# Patient Record
Sex: MALE | Race: ASIAN | NOT HISPANIC OR LATINO | Employment: FULL TIME | URBAN - METROPOLITAN AREA
[De-identification: names, ages, dates, MRNs, and addresses within clinical notes are randomized per-mention and may not be internally consistent; named-entity substitution may affect disease eponyms.]

---

## 2017-04-04 ENCOUNTER — NON-PROVIDER VISIT (OUTPATIENT)
Dept: MEDICAL GROUP | Facility: PHYSICIAN GROUP | Age: 42
End: 2017-04-04

## 2017-04-04 DIAGNOSIS — Z91.09 ENVIRONMENTAL ALLERGIES: ICD-10-CM

## 2017-04-04 PROCEDURE — 96372 THER/PROPH/DIAG INJ SC/IM: CPT | Performed by: NURSE PRACTITIONER

## 2017-04-04 RX ORDER — TRIAMCINOLONE ACETONIDE 40 MG/ML
40 INJECTION, SUSPENSION INTRA-ARTICULAR; INTRAMUSCULAR ONCE
Status: COMPLETED | OUTPATIENT
Start: 2017-04-04 | End: 2017-04-04

## 2017-04-04 RX ADMIN — TRIAMCINOLONE ACETONIDE 40 MG: 40 INJECTION, SUSPENSION INTRA-ARTICULAR; INTRAMUSCULAR at 10:24

## 2017-04-04 NOTE — MR AVS SNAPSHOT
Joes Stroud   2017 9:15 AM   Non-Provider Visit   MRN: [id number]    Department:  [facility]   Dept Phone:  [phone]    Description:  Male : 1975   Provider:  RADHA TITUS MA           Reason for Visit     Injections Kenalog      Allergies as of 2017     No Known Allergies      You were diagnosed with     Environmental allergies   [585971]         Vital Signs     Smoking Status                   Never Smoker            Basic Information     Date Of Birth Sex Race Ethnicity Preferred Language    1975 Male  Non- English      Problem List              ICD-10-CM Priority Class Noted - Resolved    Environmental allergies Z91.09   2016 - Present      Health Maintenance        Date Due Completion Dates    IMM DTaP/Tdap/Td Vaccine (1 - Tdap) 1993 ---            Current Immunizations     No immunizations on file.      Below and/or attached are the medications your provider expects you to take. Review all of your home medications and newly ordered medications with your provider and/or pharmacist. Follow medication instructions as directed by your provider and/or pharmacist. Please keep your medication list with you and share with your provider. Update the information when medications are discontinued, doses are changed, or new medications (including over-the-counter products) are added; and carry medication information at all times in the event of emergency situations     Allergies:  No Known Allergies          Medications  Valid as of: 2017 - 11:43 AM    Generic Name Brand Name Tablet Size Instructions for use    .                 Medicines prescribed today were sent to:     [id number] [place] [address] [facility]    [address] [place] [address]    Phone: [phone] Fax: [phone]    Open 24 Hours?: No      Medication refill instructions:       If your prescription bottle indicates you have medication refills left, it is  not necessary to call your provider’s office. Please contact your pharmacy and they will refill your medication.    If your prescription bottle indicates you do not have any refills left, you may request refills at any time through one of the following ways: The online [employer] system (except Urgent Care), by calling your provider’s office, or by asking your pharmacy to contact your provider’s office with a refill request. Medication refills are processed only during regular business hours and may not be available until the next business day. Your provider may request additional information or to have a follow-up visit with you prior to refilling your medication.   *Please Note: Medication refills are assigned a new Rx number when refilled electronically. Your pharmacy may indicate that no refills were authorized even though a new prescription for the same medication is available at the pharmacy. Please request the medicine by name with the pharmacy before contacting your provider for a refill.           [employer] Status: Patient Declined

## 2017-04-04 NOTE — PROGRESS NOTES
Jose Stroud is a 41 y.o. male here for a non-provider visit for Kenalog injection.    Reason for injection: Environmental Allergies  Order in MAR?: No  Patient supplied?:No  Minimum interval has been met for this injection (per MAR order): Yes    Order and dose verified by:  Patient tolerated injection and no adverse effects were observed or reported yes.    # of Administrations remaining in MAR:0

## 2017-08-23 ENCOUNTER — TELEPHONE (OUTPATIENT)
Dept: MEDICAL GROUP | Facility: PHYSICIAN GROUP | Age: 42
End: 2017-08-23

## 2017-08-23 ENCOUNTER — NON-PROVIDER VISIT (OUTPATIENT)
Dept: MEDICAL GROUP | Facility: PHYSICIAN GROUP | Age: 42
End: 2017-08-23

## 2017-08-23 DIAGNOSIS — Z91.09 ENVIRONMENTAL ALLERGIES: ICD-10-CM

## 2017-08-23 PROCEDURE — 96372 THER/PROPH/DIAG INJ SC/IM: CPT | Performed by: NURSE PRACTITIONER

## 2017-08-23 RX ORDER — TRIAMCINOLONE ACETONIDE 40 MG/ML
40 INJECTION, SUSPENSION INTRA-ARTICULAR; INTRAMUSCULAR ONCE
Status: COMPLETED | OUTPATIENT
Start: 2017-08-23 | End: 2017-08-23

## 2017-08-23 RX ADMIN — TRIAMCINOLONE ACETONIDE 40 MG: 40 INJECTION, SUSPENSION INTRA-ARTICULAR; INTRAMUSCULAR at 09:49

## 2017-08-23 NOTE — NON-PROVIDER
Jose Stroud is a 41 y.o. male here for a non-provider visit for Kenolog injection.    Reason for injection: Environmental Allergies  Order in MAR?: Yes  Patient supplied?:No  Minimum interval has been met for this injection (per MAR order): Yes    Order and dose verified by: ML   Patient tolerated injection and no adverse effects were observed or reported: Yes    # of Administrations remaining in MAR: 0

## 2017-08-23 NOTE — MR AVS SNAPSHOT
Jose Stroud   2017 9:00 AM   Non-Provider Visit   MRN: [id number]    Department:  [facility]   Dept Phone:  [phone]    Description:  Male : 1975   Provider:  RADHA TITUS MA           Reason for Visit     Injections Kenolog      Allergies as of 2017     No Known Allergies      You were diagnosed with     Environmental allergies   [139688]         Vital Signs     Smoking Status                   Never Smoker            Basic Information     Date Of Birth Sex Race Ethnicity Preferred Language    1975 Male  Non- English      Problem List              ICD-10-CM Priority Class Noted - Resolved    Environmental allergies Z91.09   2016 - Present      Health Maintenance        Date Due Completion Dates    IMM DTaP/Tdap/Td Vaccine (1 - Tdap) 1993 ---    IMM INFLUENZA (1) 2017 ---            Current Immunizations     No immunizations on file.      Below and/or attached are the medications your provider expects you to take. Review all of your home medications and newly ordered medications with your provider and/or pharmacist. Follow medication instructions as directed by your provider and/or pharmacist. Please keep your medication list with you and share with your provider. Update the information when medications are discontinued, doses are changed, or new medications (including over-the-counter products) are added; and carry medication information at all times in the event of emergency situations     Allergies:  No Known Allergies          Medications  Valid as of: 2017 -  9:52 AM    Generic Name Brand Name Tablet Size Instructions for use    .                 Medicines prescribed today were sent to:     [id number] [place] [address] [place] [address] [address] [place] [address]    Phone: [phone] Fax: [phone]    Open 24 Hours?: No      Medication refill instructions:       If your prescription bottle indicates you  have medication refills left, it is not necessary to call your provider’s office. Please contact your pharmacy and they will refill your medication.    If your prescription bottle indicates you do not have any refills left, you may request refills at any time through one of the following ways: The online [employer] system (except Urgent Care), by calling your provider’s office, or by asking your pharmacy to contact your provider’s office with a refill request. Medication refills are processed only during regular business hours and may not be available until the next business day. Your provider may request additional information or to have a follow-up visit with you prior to refilling your medication.   *Please Note: Medication refills are assigned a new Rx number when refilled electronically. Your pharmacy may indicate that no refills were authorized even though a new prescription for the same medication is available at the pharmacy. Please request the medicine by name with the pharmacy before contacting your provider for a refill.           [employer] Status: Patient Declined

## 2018-01-31 ENCOUNTER — OFFICE VISIT (OUTPATIENT)
Dept: MEDICAL GROUP | Facility: PHYSICIAN GROUP | Age: 43
End: 2018-01-31

## 2018-01-31 VITALS
HEIGHT: 68 IN | RESPIRATION RATE: 16 BRPM | WEIGHT: 151 LBS | SYSTOLIC BLOOD PRESSURE: 110 MMHG | TEMPERATURE: 97.9 F | OXYGEN SATURATION: 96 % | HEART RATE: 70 BPM | BODY MASS INDEX: 22.88 KG/M2 | DIASTOLIC BLOOD PRESSURE: 80 MMHG

## 2018-01-31 DIAGNOSIS — Z76.89 ENCOUNTER TO ESTABLISH CARE WITH NEW DOCTOR: ICD-10-CM

## 2018-01-31 DIAGNOSIS — Z91.09 ENVIRONMENTAL ALLERGIES: ICD-10-CM

## 2018-01-31 PROCEDURE — 99213 OFFICE O/P EST LOW 20 MIN: CPT | Performed by: NURSE PRACTITIONER

## 2018-01-31 RX ORDER — TRIAMCINOLONE ACETONIDE 40 MG/ML
40 INJECTION, SUSPENSION INTRA-ARTICULAR; INTRAMUSCULAR ONCE
Status: COMPLETED | OUTPATIENT
Start: 2018-01-31 | End: 2018-01-31

## 2018-01-31 RX ADMIN — TRIAMCINOLONE ACETONIDE 40 MG: 40 INJECTION, SUSPENSION INTRA-ARTICULAR; INTRAMUSCULAR at 10:46

## 2018-01-31 ASSESSMENT — PATIENT HEALTH QUESTIONNAIRE - PHQ9: CLINICAL INTERPRETATION OF PHQ2 SCORE: 0

## 2018-01-31 NOTE — PROGRESS NOTES
Chief Complaint   Patient presents with   • Establish Care   • Other     Kenalog shot       HISTORY OF PRESENT ILLNESS: Patient is a 42 y.o. male new patient who presents today to discuss the following issues:    Encounter to establish care with new doctor  Is here to establish with a new primary care provider.  Was previously seen by Serenity Barnes.    Environmental allergies  Allergies have been terrible, and much earlier this year.  Would like to proceed with a Kenalog shot. Gets them twice a year on average.      Patient Active Problem List    Diagnosis Date Noted   • Encounter to establish care with new doctor 01/31/2018   • Environmental allergies 04/01/2016       Allergies:Patient has no known allergies.    No current outpatient prescriptions on file.     Current Facility-Administered Medications   Medication Dose Route Frequency Provider Last Rate Last Dose   • triamcinolone acetonide (KENALOG-40) injection 40 mg  40 mg Intramuscular Once Darwin Bell, A.P.N.           Social History   Substance Use Topics   • Smoking status: Never Smoker   • Smokeless tobacco: Never Used   • Alcohol use Yes      Comment: occ       No family status information on file.   History reviewed. No pertinent family history.    Review of Systems:   Constitutional: Negative for fever, chills, weight loss and malaise/fatigue.   HENT: Negative for ear pain, nosebleeds, congestion, sore throat and neck pain.  Positive for seasonal allergies.  Eyes: Negative for blurred vision.   Respiratory: Negative for cough, sputum production, shortness of breath and wheezing.    Cardiovascular: Negative for chest pain, palpitations, orthopnea and leg swelling.   Gastrointestinal: Negative for heartburn, nausea, vomiting and abdominal pain.   Genitourinary: Negative for dysuria, urgency and frequency.   Musculoskeletal: Negative for myalgias, joint pain, and back pain.  Skin: Negative for rash and itching.   Neurological: Negative for  "dizziness, tingling, tremors, sensory change, focal weakness and headaches.   Endo/Heme/Allergies: Does not bruise/bleed easily.   Psychiatric/Behavioral: Negative for depression, suicidal ideas and memory loss.  The patient is not nervous/anxious and does not have insomnia.    All other systems reviewed and are negative except as in HPI.    Exam:  Blood pressure 110/80, pulse 70, temperature 36.6 °C (97.9 °F), resp. rate 16, height 1.727 m (5' 8\"), weight 68.5 kg (151 lb), SpO2 96 %.  General:  Well nourished, well developed male in NAD  Head: Grossly normal.  Neck: Supple without JVD or bruit. Thyroid is not enlarged.  Pulmonary: Clear to ausculation. Normal effort. No rales, ronchi, or wheezing.  Cardiovascular: Regular rate and rhythm without murmur.   Abdomen:  Bowel sounds + x 4. Soft, non-tender, nondistended.  Extremities: No clubbing, cyanosis, or edema.  Skin: Intact with no obvious rashes or lesions.  Neuro: Grossly intact.  Psych: Alert and oriented x 3.  Mood and affect appropriate.    Medical decision-making and discussion: Jose is here to establish with a new primary care provider.  We reviewed his past medical history and discussed his current medications.  He was given a Kenalog shot. He will sign a records release for his previous provider, he will sign up with [facility], and he will plan to follow-up here as needed.     I have placed the below orders and discussed them with an approved delegating provider. The MA is performing the below orders under the direction of Dr. Leon, who have provided verbal consent for supervision.          Assessment/Plan:  1. Encounter to establish care with new doctor     2. Environmental allergies  triamcinolone acetonide (KENALOG-40) injection 40 mg       Return if symptoms worsen or fail to improve.    Please note that this dictation was created using voice recognition software. I have made every reasonable attempt to correct obvious errors, but I expect that there " are errors of grammar and possibly content that I did not discover before finalizing the note.

## 2018-01-31 NOTE — ASSESSMENT & PLAN NOTE
Allergies have been terrible, and much earlier this year.  Would like to proceed with a Kenalog shot. Gets them twice a year on average.

## 2018-08-30 ENCOUNTER — NON-PROVIDER VISIT (OUTPATIENT)
Dept: MEDICAL GROUP | Facility: PHYSICIAN GROUP | Age: 43
End: 2018-08-30

## 2018-08-30 ENCOUNTER — APPOINTMENT (OUTPATIENT)
Dept: MEDICAL GROUP | Facility: PHYSICIAN GROUP | Age: 43
End: 2018-08-30

## 2018-08-30 DIAGNOSIS — Z91.09 ENVIRONMENTAL ALLERGIES: ICD-10-CM

## 2018-08-30 PROCEDURE — 96372 THER/PROPH/DIAG INJ SC/IM: CPT | Performed by: NURSE PRACTITIONER

## 2018-08-30 RX ORDER — TRIAMCINOLONE ACETONIDE 40 MG/ML
40 INJECTION, SUSPENSION INTRA-ARTICULAR; INTRAMUSCULAR ONCE
Status: COMPLETED | OUTPATIENT
Start: 2018-08-30 | End: 2018-08-30

## 2018-08-30 RX ADMIN — TRIAMCINOLONE ACETONIDE 40 MG: 40 INJECTION, SUSPENSION INTRA-ARTICULAR; INTRAMUSCULAR at 11:29

## 2018-08-30 NOTE — PROGRESS NOTES
Jose Stroud is a 42 y.o. male here for a non-provider visit for kenalog injection.    Reason for injection: Environmental allergies   Order in MAR?: Yes  Patient supplied?:No  Minimum interval has been met for this injection (per MAR order): Yes    Order and dose verified by: RT  Patient tolerated injection and no adverse effects were observed or reported: Yes    # of Administrations remaining in MAR: n/a

## 2019-04-11 ENCOUNTER — OFFICE VISIT (OUTPATIENT)
Dept: MEDICAL GROUP | Facility: PHYSICIAN GROUP | Age: 44
End: 2019-04-11

## 2019-04-11 VITALS
BODY MASS INDEX: 22.73 KG/M2 | TEMPERATURE: 98 F | SYSTOLIC BLOOD PRESSURE: 122 MMHG | RESPIRATION RATE: 16 BRPM | HEIGHT: 68 IN | WEIGHT: 150 LBS | OXYGEN SATURATION: 96 % | DIASTOLIC BLOOD PRESSURE: 74 MMHG | HEART RATE: 70 BPM

## 2019-04-11 DIAGNOSIS — J30.2 SEASONAL ALLERGIES: ICD-10-CM

## 2019-04-11 DIAGNOSIS — Z91.09 ENVIRONMENTAL ALLERGIES: ICD-10-CM

## 2019-04-11 PROBLEM — Z76.89 ENCOUNTER TO ESTABLISH CARE WITH NEW DOCTOR: Status: RESOLVED | Noted: 2018-01-31 | Resolved: 2019-04-11

## 2019-04-11 PROCEDURE — 99212 OFFICE O/P EST SF 10 MIN: CPT | Performed by: NURSE PRACTITIONER

## 2019-04-11 RX ORDER — TRIAMCINOLONE ACETONIDE 40 MG/ML
40 INJECTION, SUSPENSION INTRA-ARTICULAR; INTRAMUSCULAR ONCE
Status: COMPLETED | OUTPATIENT
Start: 2019-04-11 | End: 2019-04-11

## 2019-04-11 RX ADMIN — TRIAMCINOLONE ACETONIDE 40 MG: 40 INJECTION, SUSPENSION INTRA-ARTICULAR; INTRAMUSCULAR at 15:32

## 2019-04-11 ASSESSMENT — PATIENT HEALTH QUESTIONNAIRE - PHQ9: CLINICAL INTERPRETATION OF PHQ2 SCORE: 0

## 2019-04-16 NOTE — ASSESSMENT & PLAN NOTE
Allergies have been terrible and he would like to proceed with a Kenalog shot.  Gets them once a year and understands risks vs benefits.  He will plan to proceed with lab work when he is able to get health insurance.

## 2020-03-06 ENCOUNTER — TELEPHONE (OUTPATIENT)
Dept: MEDICAL GROUP | Facility: PHYSICIAN GROUP | Age: 45
End: 2020-03-06

## 2020-03-06 DIAGNOSIS — Z91.09 ENVIRONMENTAL ALLERGIES: ICD-10-CM

## 2020-03-06 NOTE — TELEPHONE ENCOUNTER
Pt is on MA schedule for Mon 03/10/2020 for allergy shot and needs an order.  Last seen 04/22/2019.

## 2020-03-10 ENCOUNTER — NON-PROVIDER VISIT (OUTPATIENT)
Dept: MEDICAL GROUP | Facility: PHYSICIAN GROUP | Age: 45
End: 2020-03-10

## 2020-03-10 DIAGNOSIS — Z91.09 ENVIRONMENTAL ALLERGIES: ICD-10-CM

## 2020-03-10 PROCEDURE — 96372 THER/PROPH/DIAG INJ SC/IM: CPT | Performed by: NURSE PRACTITIONER

## 2020-03-10 RX ORDER — TRIAMCINOLONE ACETONIDE 40 MG/ML
40 INJECTION, SUSPENSION INTRA-ARTICULAR; INTRAMUSCULAR ONCE
Status: COMPLETED | OUTPATIENT
Start: 2020-03-10 | End: 2020-03-10

## 2020-03-10 RX ADMIN — TRIAMCINOLONE ACETONIDE 40 MG: 40 INJECTION, SUSPENSION INTRA-ARTICULAR; INTRAMUSCULAR at 09:04

## 2020-03-10 NOTE — PROGRESS NOTES
Jose العلي is a 44 y.o. male here for a non-provider visit for kenalog injection.    Reason for injection: environmental allergies  Order in MAR?: Yes  Patient supplied?:No  Minimum interval has been met for this injection (per MAR order): Yes    Order and dose verified by: CAROL  Patient tolerated injection and no adverse effects were observed or reported: Yes    # of Administrations remaining in MAR: 0

## 2021-04-06 ENCOUNTER — OFFICE VISIT (OUTPATIENT)
Dept: MEDICAL GROUP | Facility: PHYSICIAN GROUP | Age: 46
End: 2021-04-06

## 2021-04-06 VITALS
BODY MASS INDEX: 22.58 KG/M2 | DIASTOLIC BLOOD PRESSURE: 74 MMHG | WEIGHT: 149 LBS | RESPIRATION RATE: 14 BRPM | HEART RATE: 60 BPM | TEMPERATURE: 98.6 F | OXYGEN SATURATION: 100 % | HEIGHT: 68 IN | SYSTOLIC BLOOD PRESSURE: 118 MMHG

## 2021-04-06 DIAGNOSIS — Z91.09 ENVIRONMENTAL ALLERGIES: ICD-10-CM

## 2021-04-06 DIAGNOSIS — Z00.00 WELLNESS EXAMINATION: ICD-10-CM

## 2021-04-06 PROCEDURE — 99386 PREV VISIT NEW AGE 40-64: CPT | Performed by: INTERNAL MEDICINE

## 2021-04-06 RX ORDER — TRIAMCINOLONE ACETONIDE 40 MG/ML
40 INJECTION, SUSPENSION INTRA-ARTICULAR; INTRAMUSCULAR ONCE
Status: COMPLETED | OUTPATIENT
Start: 2021-04-06 | End: 2021-04-06

## 2021-04-06 RX ADMIN — TRIAMCINOLONE ACETONIDE 40 MG: 40 INJECTION, SUSPENSION INTRA-ARTICULAR; INTRAMUSCULAR at 11:57

## 2021-04-06 ASSESSMENT — PATIENT HEALTH QUESTIONNAIRE - PHQ9: CLINICAL INTERPRETATION OF PHQ2 SCORE: 0

## 2021-04-06 NOTE — PROGRESS NOTES
Subjective:     CC: Establish care, annual well visit    HPI:   Jose العلي is a 45 y.o. male who presents for an annual exam.     The patient feels well.  He has severe seasonal allergies for which he states that oral antihistamines, intranasal steroids, as well as immunotherapy have been ineffective.  His symptoms are typically controlled with an annual Kenalog injection.  He does not currently have health insurance, so declines any labs and is paying out of pocket for the Kenalog injection.    Health Maintenance  Cholesterol Screening: Deferred to lack of insurance coverage  Diabetes Screening: Deferred to lack of insurance coverage  Diet: Healthy, chicken, rice, fish, bad on weekends  Exercise: Five days weekly  Substance Abuse: Drinks approximately 5-10 beers every weekend    Infectious disease screening/Immunizations  --STI Screening: Declined  --Practices safe sex with his wife.  --HIV Screening: Declined due to lack of insurance  --Hepatitis C Screening: Declined due to lack of insurance coverage  --Immunizations:    Tetanus: Declined a lack of insurance coverage    He  has no past medical history on file.  He  has no past surgical history on file.  Family History   Problem Relation Age of Onset   • Diabetes Mother    • Cancer Father      Social History     Tobacco Use   • Smoking status: Never Smoker   • Smokeless tobacco: Never Used   Substance Use Topics   • Alcohol use: Yes     Comment: occ   • Drug use: No       Patient Active Problem List    Diagnosis Date Noted   • Environmental allergies 04/01/2016       No current outpatient medications on file.     Current Facility-Administered Medications   Medication Dose Route Frequency Provider Last Rate Last Admin   • triamcinolone acetonide (KENALOG-40) injection 40 mg  40 mg Intramuscular Once Margaret Otero M.D.        (including changes today)  Allergies: Patient has no known allergies.    Review of Systems   Constitutional: Negative for fever,  "chills   Respiratory: Negative for cough and shortness of breath.    Cardiovascular: Negative for chest pain or palpitations  Gastrointestinal: Negative for nausea, vomiting, abdominal pain and diarrhea.   Psychiatric/Behavioral: Negative for depression.  The patient is not nervous/anxious.    Objective:     /74   Pulse 60   Temp 37 °C (98.6 °F)   Resp 14   Ht 1.727 m (5' 8\")   Wt 67.6 kg (149 lb)   SpO2 100%   BMI 22.66 kg/m²   Body mass index is 22.66 kg/m².  Wt Readings from Last 4 Encounters:   04/06/21 67.6 kg (149 lb)   04/11/19 68 kg (150 lb)   01/31/18 68.5 kg (151 lb)   04/01/16 67.1 kg (148 lb)       Physical Exam:  Constitutional: Well-developed and well-nourished.  Eyes: Pupils are equal, round, and reactive to light. No scleral icterus.   Mouth/Throat: Oropharynx is clear and moist. Posterior pharynx without erythema or exudates.  Neck: No thyromegaly present. No lymphadenopathy--cervical or supraclavicular.  Cardiovascular: Regular rate and rhythm, S1 and S2 without murmur, rubs, or gallops.  Lungs: Normal inspiratory effort, CTA bilaterally, no wheezes/rhonchi/rales  Abdomen: Soft, non tender, and without distention.  Extremities: No cyanosis, clubbing, erythema, nor edema.   Psychiatric:  Behavior, mood, and affect are appropriate.    Assessment and Plan:   Jose العلي is a 45 y.o. male who presents for an annual exam.     1. Wellness examination  HCM: Completed  Declined any lab orders due to lack of medical insurance coverage  Immunizations declined due to lack of medical insurance coverage  Patient counseled about diet, exercise, safe sex, and sun protection.    2. Environmental allergies  This is a chronic condition.  Stable.  Oral antihistamines, intranasal steroids, and allergy immunotherapy have been ineffective.  Discussed with patient that receiving a Kenalog injection today will suppress his immune system, thereby making him at increased risk of COVID-19 infection as well as " worsening outcomes should he become infected with the COVID-19 virus.  The immunosuppression due to the Kenalog injection will also suppress his immune response to the COVID-19 vaccine.  The patient expressed an understanding of this and wished to proceed with a Kenalog injection.  - triamcinolone acetonide (KENALOG-40) injection 40 mg    HCM: Completed  Labs per orders  Immunizations per orders  Patient counseled about diet, exercise, safe sex, and sun protection.    Follow-up: Return in about 1 year (around 4/6/2022).     Please note that this dictation was created using voice recognition software. I have made every reasonable attempt to correct obvious errors, but I expect that there are errors of grammar and possibly content that I did not discover before finalizing the note.

## 2022-04-14 ENCOUNTER — NON-PROVIDER VISIT (OUTPATIENT)
Dept: MEDICAL GROUP | Facility: PHYSICIAN GROUP | Age: 47
End: 2022-04-14

## 2022-04-14 PROCEDURE — 96372 THER/PROPH/DIAG INJ SC/IM: CPT | Performed by: INTERNAL MEDICINE

## 2022-04-14 RX ORDER — TRIAMCINOLONE ACETONIDE 40 MG/ML
40 INJECTION, SUSPENSION INTRA-ARTICULAR; INTRAMUSCULAR ONCE
Status: COMPLETED | OUTPATIENT
Start: 2022-04-14 | End: 2022-04-14

## 2022-04-14 RX ADMIN — TRIAMCINOLONE ACETONIDE 40 MG: 40 INJECTION, SUSPENSION INTRA-ARTICULAR; INTRAMUSCULAR at 09:24

## 2022-04-14 NOTE — PROGRESS NOTES
Jose العلي is a 46 y.o. male here for a non-provider visit for Kenalog injection.    Reason for injection: Allergy  Order in MAR?: Yes  Patient supplied?:No  Minimum interval has been met for this injection (per MAR order): Yes    Patient tolerated injection and no adverse effects were observed or reported: Yes    # of Administrations remaining in MAR: 0

## 2023-06-22 ENCOUNTER — OFFICE VISIT (OUTPATIENT)
Dept: MEDICAL GROUP | Facility: PHYSICIAN GROUP | Age: 48
End: 2023-06-22

## 2023-06-22 VITALS
DIASTOLIC BLOOD PRESSURE: 68 MMHG | TEMPERATURE: 97.7 F | OXYGEN SATURATION: 97 % | RESPIRATION RATE: 20 BRPM | HEART RATE: 66 BPM | HEIGHT: 68 IN | WEIGHT: 152.25 LBS | SYSTOLIC BLOOD PRESSURE: 108 MMHG | BODY MASS INDEX: 23.07 KG/M2

## 2023-06-22 DIAGNOSIS — Z91.09 ENVIRONMENTAL ALLERGIES: ICD-10-CM

## 2023-06-22 PROCEDURE — 99214 OFFICE O/P EST MOD 30 MIN: CPT | Mod: 25 | Performed by: INTERNAL MEDICINE

## 2023-06-22 PROCEDURE — 3078F DIAST BP <80 MM HG: CPT | Performed by: INTERNAL MEDICINE

## 2023-06-22 PROCEDURE — 3074F SYST BP LT 130 MM HG: CPT | Performed by: INTERNAL MEDICINE

## 2023-06-22 RX ORDER — TRIAMCINOLONE ACETONIDE 40 MG/ML
40 INJECTION, SUSPENSION INTRA-ARTICULAR; INTRAMUSCULAR ONCE
Status: COMPLETED | OUTPATIENT
Start: 2023-06-22 | End: 2023-06-22

## 2023-06-22 RX ORDER — CETIRIZINE HYDROCHLORIDE 10 MG/1
10 TABLET ORAL DAILY
COMMUNITY

## 2023-06-22 RX ADMIN — TRIAMCINOLONE ACETONIDE 40 MG: 40 INJECTION, SUSPENSION INTRA-ARTICULAR; INTRAMUSCULAR at 15:38

## 2023-06-22 ASSESSMENT — PATIENT HEALTH QUESTIONNAIRE - PHQ9: CLINICAL INTERPRETATION OF PHQ2 SCORE: 0

## 2023-06-22 NOTE — PROGRESS NOTES
PRIMARY CARE CLINIC VISIT    Chief complaint:    Allergies      Pcp Margaret Otero M.D.       History of Present Illness     Environmental allergies  This is a chronic condition.  Patient has been taking Zyrtec without improvement.  He also has tried steroid nasal spray previously without improvement  Denies fever or chills.  Patient reported that he has been getting Kenalog shot once a year.  The patient request Kenalog shot today as this is the only medication that works for him.  Patient reported recurrent sneezing and runny nose congestion.  He denies fever or chills.    Discussed w pt re different treatment options for allergies which may include: oral antihistamine such as zyrtec/claritin +/- steroid nasal spray such as fluticasone +/- referral to allergy specialist.    Also dw pt the potential risks of kenalog injection which may be contributing to osteoporosis , glaucoma, diabetes and hypertension. Other risks may  include  but not limited to : irregular heart rhythm, sob, swelling, rapid wt gain, severe high bp, headaches, tinnitus, anxiety,confusion, cp, problems w vision, eye swelling, redness discomfort , or drainage, severe depression, changes in mood or behavior, seizure , muscle pain/tenderness/weakness, Nausea, bloating, appetite changes, stomach pain, sleep problems, ance, scaling, other skin changes, slow to heal wound, thinning hair, bruising, swelling, redness /pain or skin changes at the injection site, stomach upset, dizziness, wt gain, uncontrolled infection which could be life threatening...    Pt voiced understanding of the above and insists/requests to proceed with the Kenalog injection    Current Outpatient Medications on File Prior to Visit   Medication Sig Dispense Refill    cetirizine (ZYRTEC) 10 MG Tab Take 10 mg by mouth every day.       No current facility-administered medications on file prior to visit.        Allergies: Patient has no known allergies.    Current Outpatient  "Medications Ordered in Epic   Medication Sig Dispense Refill    cetirizine (ZYRTEC) 10 MG Tab Take 10 mg by mouth every day.       Current Facility-Administered Medications Ordered in Epic   Medication Dose Route Frequency Provider Last Rate Last Admin    triamcinolone acetonide (KENALOG-40) injection 40 mg  40 mg Intramuscular Once Noe JANE Powers M.D.           History reviewed. No pertinent past medical history.    History reviewed. No pertinent surgical history.    Family History   Problem Relation Age of Onset    Diabetes Mother     Cancer Father        Social History     Tobacco Use   Smoking Status Never   Smokeless Tobacco Never       Social History     Substance and Sexual Activity   Alcohol Use Yes    Comment: occ       Review of systems.  As per HPI above. All other systems reviewed and negative.      Past Medical, Social, and Family history reviewed and updated in EPIC     Objective     /68 (BP Location: Left arm, Patient Position: Sitting, BP Cuff Size: Adult)   Pulse 66   Temp 36.5 °C (97.7 °F) (Temporal)   Resp 20   Ht 1.727 m (5' 8\")   Wt 69.1 kg (152 lb 4 oz)   SpO2 97%    Body mass index is 23.15 kg/m².    General: alert in no apparent distress.  Cardiovascular: regular rate and rhythm  Pulmonary: lungs : no wheezing   Gastrointestinal: BS present. No obvious mass noted  Nose with mild mucosal formation.  No purulent drainage  Oropharynx no exudates          Assessment and Plan     1. Environmental allergies    Other orders  - cetirizine (ZYRTEC) 10 MG Tab; Take 10 mg by mouth every day.  - triamcinolone acetonide (KENALOG-40) injection 40 mg    Chronic condition.  Uncontrolled.  Patient request Kenalog injection.  Potential side effect of medication discussed with the patient.     Advised the patient to follow-up with PCP Margaret Otero M.D.         Attestation: I spent:   33   min -  That includes time for chart review before the visit, the actual patient visit, and time spent on " documentation in EMR after the visit.  Chart review/prep, review of other providers' records, imaging/lab review, face-to-face time for history/examination, ordering, prescribing,  review of results/meds/ treatment plan with patient, and care coordination.         Please note that this dictation was created using voice recognition software. I have made every reasonable attempt to correct obvious errors, but I expect that there are errors of grammar and possibly content that I did not discover before finalizing the note.    Noe Powers MD  Internal Medicine  [facility]

## 2023-06-22 NOTE — ASSESSMENT & PLAN NOTE
This is a chronic condition.  Patient has been taking Zyrtec without improvement.  He also has tried steroid nasal spray previously without improvement  Denies fever or chills.  Patient reported that he has been getting Kenalog shot once a year.  The patient request Kenalog shot today as this is the only medication that works for him.    Discussed w pt re different treatment options for allergies which may include: oral antihistamine such as zyrtec/claritin +/- steroid nasal spray such as fluticasone +/- referral to allergy specialist.    Also dw pt the potential risks of kenalog injection which may be contributing to osteoporosis , glaucoma, diabetes and hypertension. Other risks may  include  but not limited to : irregular heart rhythm, sob, swelling, rapid wt gain, severe high bp, headaches, tinnitus, anxiety,confusion, cp, problems w vision, eye swelling, redness discomfort , or drainage, severe depression, changes in mood or behavior, seizure , muscle pain/tenderness/weakness, Nausea, bloating, appetite changes, stomach pain, sleep problems, ance, scaling, other skin changes, slow to heal wound, thinning hair, bruising, swelling, redness /pain or skin changes at the injection site, stomach upset, dizziness, wt gain, uncontrolled infection which could be life threatening...    Pt voiced understanding of the above and insists/requests to proceed with the Kenalog injection

## 2024-04-18 ENCOUNTER — OFFICE VISIT (OUTPATIENT)
Dept: MEDICAL GROUP | Facility: PHYSICIAN GROUP | Age: 49
End: 2024-04-18

## 2024-04-18 VITALS
OXYGEN SATURATION: 96 % | SYSTOLIC BLOOD PRESSURE: 116 MMHG | TEMPERATURE: 97.7 F | RESPIRATION RATE: 14 BRPM | HEART RATE: 72 BPM | BODY MASS INDEX: 23.49 KG/M2 | DIASTOLIC BLOOD PRESSURE: 72 MMHG | HEIGHT: 68 IN | WEIGHT: 155 LBS

## 2024-04-18 DIAGNOSIS — Z91.09 ENVIRONMENTAL ALLERGIES: ICD-10-CM

## 2024-04-18 PROCEDURE — 99213 OFFICE O/P EST LOW 20 MIN: CPT | Mod: 25 | Performed by: INTERNAL MEDICINE

## 2024-04-18 PROCEDURE — 3074F SYST BP LT 130 MM HG: CPT | Performed by: INTERNAL MEDICINE

## 2024-04-18 PROCEDURE — 3078F DIAST BP <80 MM HG: CPT | Performed by: INTERNAL MEDICINE

## 2024-04-18 RX ORDER — TRIAMCINOLONE ACETONIDE 40 MG/ML
40 INJECTION, SUSPENSION INTRA-ARTICULAR; INTRAMUSCULAR ONCE
Status: COMPLETED | OUTPATIENT
Start: 2024-04-18 | End: 2024-04-18

## 2024-04-18 RX ADMIN — TRIAMCINOLONE ACETONIDE 40 MG: 40 INJECTION, SUSPENSION INTRA-ARTICULAR; INTRAMUSCULAR at 14:39

## 2024-04-18 ASSESSMENT — PATIENT HEALTH QUESTIONNAIRE - PHQ9: CLINICAL INTERPRETATION OF PHQ2 SCORE: 0

## 2024-04-18 NOTE — PROGRESS NOTES
PRIMARY CARE CLINIC VISIT        Chief Complaint   Patient presents with    Injections     Kenalog      Allergies  Requests Kenalog injection        History of Present Illness     Environmental allergies  Chronic condition.  Patient reported that he has tried multiple allergy medication including Zyrtec over-the-counter without improvement.  The patient denies fever or chills.  No shortness of breath or wheezing.    Patient requests Kenalog injection  He has been getting this treatment once a year.    Discussed w pt re different treatment options for allergies which may include: oral antihistamine such as zyrtec/claritin +/- steroid nasal spray such as fluticasone +/- referral to allergy specialist.    Also dw pt the potential risks of kenalog injection which may be contributing to osteoporosis , glaucoma, diabetes and hypertension. Other risks may  include  but not limited to : irregular heart rhythm, sob, swelling, rapid wt gain, severe high bp, headaches, tinnitus, anxiety,confusion, cp, problems w vision, eye swelling, redness discomfort , or drainage, severe depression, changes in mood or behavior, seizure , muscle pain/tenderness/weakness, Nausea, bloating, appetite changes, stomach pain, sleep problems, ance, scaling, other skin changes, slow to heal wound, thinning hair, bruising, swelling, redness /pain or skin changes at the injection site, stomach upset, dizziness, wt gain, uncontrolled infection which could be life threatening...    Pt voiced understanding of the above and insists/requests to proceed with the Kenalog injection     Current Outpatient Medications on File Prior to Visit   Medication Sig Dispense Refill    cetirizine (ZYRTEC) 10 MG Tab Take 10 mg by mouth every day.       No current facility-administered medications on file prior to visit.        Allergies: Patient has no known allergies.    Current Outpatient Medications Ordered in Epic   Medication Sig Dispense Refill    cetirizine  "(ZYRTEC) 10 MG Tab Take 10 mg by mouth every day.       Current Facility-Administered Medications Ordered in Epic   Medication Dose Route Frequency Provider Last Rate Last Admin    triamcinolone acetonide (Kenalog-40) injection 40 mg  40 mg Intramuscular Once Noe Powers M.D.           History reviewed. No pertinent past medical history.    History reviewed. No pertinent surgical history.    Family History   Problem Relation Age of Onset    Diabetes Mother     Cancer Father        Social History     Tobacco Use   Smoking Status Never   Smokeless Tobacco Never       Social History     Substance and Sexual Activity   Alcohol Use Yes    Comment: occ       Review of systems  As per HPI above. All other systems reviewed and negative.      Past Medical, Social, and Family history reviewed and updated in [facility]       LAB DATA:    No results found for: \"HBA1C\"    No results found for: \"WBC\", \"HEMOGLOBIN\", \"HEMATOCRIT\", \"MCV\", \"PLATELETCT\"    No results found for: \"SODIUM\", \"POTASSIUM\", \"GLUCOSE\", \"BUN\", \"CREATININE\"    No results found for: \"CHOLSTRLTOT\", \"TRIGLYCERIDE\", \"HDL\", \"LDL\"    No results found for: \"ALTSGPT\"       Objective     /72   Pulse 72   Temp 36.5 °C (97.7 °F) (Temporal)   Resp 14   Ht 1.727 m (5' 8\")   Wt 70.3 kg (155 lb)   SpO2 96%    Body mass index is 23.57 kg/m².    General: alert in no apparent distress.  Cardiovascular: regular rate and rhythm  Pulmonary: lungs : no wheezing   Gastrointestinal: BS present.   Sinus mild mucus formation.  No purulent drainage  Oropharynx no exudates        Assessment and Plan     1. Environmental allergies  Chronic condition.  Uncontrolled.  The patient has tried over-the-counter allergy medication without improvement.    - triamcinolone acetonide (Kenalog-40) injection 40 mg    Also recommend lab test to be done.  The patient declined due to lack of insurance.    Patient also declined Cologuard/colonoscopy due to lack of insurance.        Attestation: I " spent:   22   minutes -    That includes time for chart review before the visit, the actual patient visit, and time spent on documentation in EMR after the visit.  Chart review/prep, review of other providers' records, imaging/lab review, face-to-face time for history/examination, pt's counseling/education, ordering, prescribing,  review of results/meds/ treatment plan with patient, and care coordination.               Please note that this dictation was created using voice recognition software. I have made every reasonable attempt to correct obvious errors, but I expect that there are errors of grammar and possibly content that I did not discover before finalizing the note.    Noe Powers MD  Internal Medicine  [place] [facility]

## 2024-04-18 NOTE — ASSESSMENT & PLAN NOTE
Chronic condition.  Patient reported that he has tried multiple allergy medication including Zyrtec over-the-counter without improvement.  The patient denies fever or chills.  No shortness of breath or wheezing.    Patient requests Kenalog injection  He has been getting this treatment once a year.    Discussed w pt re different treatment options for allergies which may include: oral antihistamine such as zyrtec/claritin +/- steroid nasal spray such as fluticasone +/- referral to allergy specialist.    Also dw pt the potential risks of kenalog injection which may be contributing to osteoporosis , glaucoma, diabetes and hypertension. Other risks may  include  but not limited to : irregular heart rhythm, sob, swelling, rapid wt gain, severe high bp, headaches, tinnitus, anxiety,confusion, cp, problems w vision, eye swelling, redness discomfort , or drainage, severe depression, changes in mood or behavior, seizure , muscle pain/tenderness/weakness, Nausea, bloating, appetite changes, stomach pain, sleep problems, ance, scaling, other skin changes, slow to heal wound, thinning hair, bruising, swelling, redness /pain or skin changes at the injection site, stomach upset, dizziness, wt gain, uncontrolled infection which could be life threatening...    Pt voiced understanding of the above and insists/requests to proceed with the Kenalog injection

## 2025-02-26 ENCOUNTER — OFFICE VISIT (OUTPATIENT)
Dept: URGENT CARE | Facility: PHYSICIAN GROUP | Age: 50
End: 2025-02-26

## 2025-02-26 VITALS
BODY MASS INDEX: 24.34 KG/M2 | RESPIRATION RATE: 12 BRPM | HEIGHT: 67 IN | WEIGHT: 155.09 LBS | DIASTOLIC BLOOD PRESSURE: 74 MMHG | HEART RATE: 67 BPM | TEMPERATURE: 98.2 F | SYSTOLIC BLOOD PRESSURE: 112 MMHG | OXYGEN SATURATION: 98 %

## 2025-02-26 DIAGNOSIS — J30.2 SEASONAL ALLERGIES: ICD-10-CM

## 2025-02-26 RX ORDER — TRIAMCINOLONE ACETONIDE 40 MG/ML
40 INJECTION, SUSPENSION INTRA-ARTICULAR; INTRAMUSCULAR ONCE
Status: COMPLETED | OUTPATIENT
Start: 2025-02-26 | End: 2025-02-26

## 2025-02-26 RX ADMIN — TRIAMCINOLONE ACETONIDE 40 MG: 40 INJECTION, SUSPENSION INTRA-ARTICULAR; INTRAMUSCULAR at 15:03

## 2025-02-26 ASSESSMENT — ENCOUNTER SYMPTOMS
FEVER: 0
NAUSEA: 0
VOMITING: 0
HEADACHES: 0
COUGH: 0

## 2025-02-26 NOTE — PROGRESS NOTES
"Carolina العلي is a 49 y.o. male who presents with Other (Patient is requesting to get an allergy shot, states gets one every year.)            This is a new problem.  The patient presents to clinic requesting a Kenalog shot for seasonal allergies.  The patient states he receives a Kenalog shot once a year for his seasonal allergies.  The patient states he is currently experiencing allergy symptoms including sneezing, runny nose, and itchy/watery eyes.  The patient states he has tried multiple OTC remedies without improvement.  The patient reports no side effects of the Kenalog injections.  The patient is aware of the associated risks.  The patient continues to request a Kenalog injection at this time.      Other  Pertinent negatives include no coughing, fever, headaches, nausea or vomiting.     PMH:  has no past medical history on file.  MEDS:   Current Outpatient Medications:     cetirizine (ZYRTEC) 10 MG Tab, Take 10 mg by mouth every day. (Patient not taking: Reported on 2/26/2025), Disp: , Rfl:   ALLERGIES: No Known Allergies  SURGHX: History reviewed. No pertinent surgical history.  SOCHX:  reports that he has never smoked. He has never used smokeless tobacco. He reports current alcohol use. He reports that he does not use drugs.  FH: Family history was reviewed, no pertinent findings to report      Review of Systems   Constitutional:  Negative for fever.   Respiratory:  Negative for cough.    Gastrointestinal:  Negative for nausea and vomiting.   Neurological:  Negative for headaches.   Endo/Heme/Allergies:  Positive for environmental allergies.              Objective     /74   Pulse 67   Temp 36.8 °C (98.2 °F) (Temporal)   Resp 12   Ht 1.702 m (5' 7\")   Wt 70.4 kg (155 lb 1.5 oz)   SpO2 98%   BMI 24.29 kg/m²      Physical Exam  Constitutional:       General: He is not in acute distress.     Appearance: Normal appearance. He is well-developed. He is not ill-appearing.   HENT:      " Head: Normocephalic and atraumatic.      Right Ear: External ear normal.      Left Ear: External ear normal.   Eyes:      Extraocular Movements: Extraocular movements intact.      Conjunctiva/sclera: Conjunctivae normal.   Cardiovascular:      Rate and Rhythm: Normal rate and regular rhythm.      Heart sounds: Normal heart sounds.   Pulmonary:      Effort: Pulmonary effort is normal. No respiratory distress.      Breath sounds: Normal breath sounds. No wheezing.   Musculoskeletal:         General: Normal range of motion.      Cervical back: Normal range of motion and neck supple.   Skin:     General: Skin is warm and dry.   Neurological:      Mental Status: He is alert and oriented to person, place, and time.                  Progress:  Kenalog 40mg IM given in clinic.                 Assessment & Plan  Seasonal allergies    Orders:    triamcinolone acetonide (Kenalog-40) injection 40 mg       The patient's presenting symptoms and physical exam findings are consistent with seasonal allergies.  The patient is requesting a Kenalog shot at this time.  The patient states he receives a yearly Kenalog shot for his seasonal allergies.  The patient's last Kenalog shot was in April 2024.  Advised the patient of the associated risks of receiving a steroid injection, including but not limited to elevated blood pressure, elevated blood sugar, osteoporosis, and or decreased immune system.  The patient verbalized understanding.  The patient is aware of the risks, and states he has had a Kenalog shot every year for approximately 25 years.  The patient continues to request a Kenalog shot at this time.  The patient was given a Kenalog shot as requested.  Advised the patient to follow-up with primary care.  Discussed return precautions with the patient, and he verbalized understanding.    Differential diagnoses, supportive care, and indications for immediate follow-up discussed with patient.   Instructed to return to clinic or  nearest emergency department for any change in condition, further concerns, or worsening of symptoms.    I personally reviewed prior external notes and test results pertinent to today's visit.  I have independently reviewed and interpreted all diagnostics ordered during this urgent care visit.     Please note that this dictation was created using voice recognition software. I have made every reasonable attempt to correct obvious errors, but I expect that there may be errors of grammar and possibly content that I did not discover before finalizing the note.     This note was electronically signed by Bianca Brown PA-C